# Patient Record
Sex: MALE | Race: WHITE | NOT HISPANIC OR LATINO | Employment: OTHER | URBAN - NONMETROPOLITAN AREA
[De-identification: names, ages, dates, MRNs, and addresses within clinical notes are randomized per-mention and may not be internally consistent; named-entity substitution may affect disease eponyms.]

---

## 2023-02-03 PROBLEM — N41.9 PROSTATITIS: Status: RESOLVED | Noted: 2023-02-03 | Resolved: 2023-02-03

## 2023-02-03 PROBLEM — H81.10 BPPV (BENIGN PAROXYSMAL POSITIONAL VERTIGO): Status: RESOLVED | Noted: 2023-02-03 | Resolved: 2023-02-03

## 2023-02-03 PROBLEM — R10.30 GROIN PAIN: Status: RESOLVED | Noted: 2023-02-03 | Resolved: 2023-02-03

## 2023-02-03 PROBLEM — R31.9 HEMATURIA: Status: RESOLVED | Noted: 2023-02-03 | Resolved: 2023-02-03

## 2023-02-03 PROBLEM — N52.9 ERECTILE DYSFUNCTION: Status: RESOLVED | Noted: 2023-02-03 | Resolved: 2023-02-03

## 2023-02-03 PROBLEM — R06.02 SOB (SHORTNESS OF BREATH) ON EXERTION: Status: RESOLVED | Noted: 2023-02-03 | Resolved: 2023-02-03

## 2023-02-03 PROBLEM — R35.0 URINARY FREQUENCY: Status: RESOLVED | Noted: 2023-02-03 | Resolved: 2023-02-03

## 2023-02-03 PROBLEM — B02.9 HERPES ZOSTER INFECTION OF THORACIC REGION: Status: RESOLVED | Noted: 2023-02-03 | Resolved: 2023-02-03

## 2023-02-03 PROBLEM — N32.89 ERYTHEMATOUS BLADDER MUCOSA: Status: RESOLVED | Noted: 2023-02-03 | Resolved: 2023-02-03

## 2023-02-03 PROBLEM — R35.1 NOCTURIA: Status: RESOLVED | Noted: 2023-02-03 | Resolved: 2023-02-03

## 2023-02-03 PROBLEM — I73.9 PVD (PERIPHERAL VASCULAR DISEASE) (CMS-HCC): Status: RESOLVED | Noted: 2023-02-03 | Resolved: 2023-02-03

## 2023-02-03 PROBLEM — N32.89 BLADDER WALL THICKENING: Status: RESOLVED | Noted: 2023-02-03 | Resolved: 2023-02-03

## 2023-02-03 PROBLEM — E78.5 HLD (HYPERLIPIDEMIA): Status: ACTIVE | Noted: 2023-02-03

## 2023-02-03 PROBLEM — R68.89 ABNORMAL FINDING ON SCREENING PROCEDURE: Status: RESOLVED | Noted: 2023-02-03 | Resolved: 2023-02-03

## 2023-02-03 RX ORDER — ACETAMINOPHEN 160 MG/5ML
SUSPENSION, ORAL (FINAL DOSE FORM) ORAL
COMMUNITY
Start: 2021-04-06

## 2023-02-03 RX ORDER — METHYLDOPA 250 MG/1
TABLET, FILM COATED ORAL
COMMUNITY
Start: 2021-04-06 | End: 2024-05-07 | Stop reason: WASHOUT

## 2023-02-03 RX ORDER — LUTEIN 10 MG
TABLET ORAL
COMMUNITY
Start: 2021-04-06

## 2023-02-03 RX ORDER — ELECTROLYTES/DEXTROSE
SOLUTION, ORAL ORAL
COMMUNITY
Start: 2021-04-06 | End: 2024-05-07 | Stop reason: WASHOUT

## 2023-02-03 RX ORDER — ACETAMINOPHEN 500 MG
TABLET ORAL
COMMUNITY
Start: 2021-04-06

## 2023-02-03 RX ORDER — ZEAXANTHIN 90 %
POWDER (GRAM) MISCELLANEOUS
COMMUNITY
Start: 2021-04-06

## 2023-02-03 RX ORDER — PERPHENAZINE/AMITRIPTYLINE HCL 2 MG-10 MG
TABLET ORAL
COMMUNITY
Start: 2021-04-06 | End: 2023-04-05 | Stop reason: ALTCHOICE

## 2023-02-03 RX ORDER — ATORVASTATIN CALCIUM 20 MG/1
1 TABLET, FILM COATED ORAL NIGHTLY
COMMUNITY
Start: 2022-04-13 | End: 2023-04-03

## 2023-04-04 PROBLEM — E66.9 CLASS 1 OBESITY WITH BODY MASS INDEX (BMI) OF 30.0 TO 30.9 IN ADULT: Status: ACTIVE | Noted: 2023-04-04

## 2023-04-04 PROBLEM — E66.811 CLASS 1 OBESITY WITH BODY MASS INDEX (BMI) OF 30.0 TO 30.9 IN ADULT: Status: ACTIVE | Noted: 2023-04-04

## 2023-04-05 ENCOUNTER — OFFICE VISIT (OUTPATIENT)
Dept: PRIMARY CARE | Facility: CLINIC | Age: 68
End: 2023-04-05
Payer: MEDICARE

## 2023-04-05 VITALS
BODY MASS INDEX: 29.89 KG/M2 | WEIGHT: 186 LBS | SYSTOLIC BLOOD PRESSURE: 130 MMHG | DIASTOLIC BLOOD PRESSURE: 80 MMHG | HEIGHT: 66 IN | HEART RATE: 69 BPM

## 2023-04-05 DIAGNOSIS — E78.2 MIXED HYPERLIPIDEMIA: ICD-10-CM

## 2023-04-05 DIAGNOSIS — Z00.00 MEDICARE ANNUAL WELLNESS VISIT, SUBSEQUENT: ICD-10-CM

## 2023-04-05 DIAGNOSIS — E66.09 CLASS 1 OBESITY DUE TO EXCESS CALORIES WITHOUT SERIOUS COMORBIDITY WITH BODY MASS INDEX (BMI) OF 30.0 TO 30.9 IN ADULT: ICD-10-CM

## 2023-04-05 DIAGNOSIS — Z13.6 SCREENING FOR AAA (ABDOMINAL AORTIC ANEURYSM): Primary | ICD-10-CM

## 2023-04-05 PROCEDURE — 1159F MED LIST DOCD IN RCRD: CPT | Performed by: INTERNAL MEDICINE

## 2023-04-05 PROCEDURE — 99213 OFFICE O/P EST LOW 20 MIN: CPT | Performed by: INTERNAL MEDICINE

## 2023-04-05 PROCEDURE — 3008F BODY MASS INDEX DOCD: CPT | Performed by: INTERNAL MEDICINE

## 2023-04-05 PROCEDURE — 1036F TOBACCO NON-USER: CPT | Performed by: INTERNAL MEDICINE

## 2023-04-05 PROCEDURE — G0439 PPPS, SUBSEQ VISIT: HCPCS | Performed by: INTERNAL MEDICINE

## 2023-04-05 PROCEDURE — 1160F RVW MEDS BY RX/DR IN RCRD: CPT | Performed by: INTERNAL MEDICINE

## 2023-04-05 ASSESSMENT — PATIENT HEALTH QUESTIONNAIRE - PHQ9
1. LITTLE INTEREST OR PLEASURE IN DOING THINGS: NOT AT ALL
2. FEELING DOWN, DEPRESSED OR HOPELESS: NOT AT ALL
SUM OF ALL RESPONSES TO PHQ9 QUESTIONS 1 AND 2: 0

## 2023-04-05 NOTE — PROGRESS NOTES
Chief Complaint: Medicare Wellness Exam/Comprehensive Problem Focused Follow Up and Physical Exam    HPI:  Patient is here today for [facility].       Active Problem List  Patient Active Problem List   Diagnosis    HLD (hyperlipidemia)    Class 1 obesity with body mass index (BMI) of 30.0 to 30.9 in adult    Screening for AAA (abdominal aortic aneurysm)       Comprehensive Medical/Surgical/Social/Family History  Past Medical History:   Diagnosis Date    Abnormal finding on screening procedure 02/03/2023    Bladder wall thickening 02/03/2023    BPPV (benign paroxysmal positional vertigo) 02/03/2023    Erythematous bladder mucosa 02/03/2023    Groin pain 02/03/2023    Hematuria 02/03/2023    Herpes zoster infection of thoracic region 02/03/2023    Nocturia 02/03/2023    Personal history of urinary (tract) infections     History of urinary tract infection    Prostatitis 02/03/2023    PVD (peripheral vascular disease) (CMS/[facility]) 02/03/2023    SOB (shortness of breath) on exertion 02/03/2023    Urinary frequency 02/03/2023     Past Surgical History:   Procedure Laterality Date    OTHER SURGICAL HISTORY  04/06/2021    Tonsillectomy with adenoidectomy     Social History     Tobacco Use    Smoking status: Former     Types: Cigarettes    Smokeless tobacco: Never   Substance Use Topics    Alcohol use: Yes     Comment: Weekly alcohol    Drug use: Never     Family History   Problem Relation Name Age of Onset    Dementia Father      Colon polyps Sister      Lung disease Other           Allergies and Medications  Patient has no known allergies.  Current Outpatient Medications on File Prior to Visit   Medication Sig Dispense Refill    atorvastatin (Lipitor) 20 mg tablet TAKE 1 TABLET BY MOUTH EVERYDAY AT BEDTIME 90 tablet 3    biotin 5 mg capsule Take by mouth.      cholecalciferol (Vitamin D-3) 50 mcg (2,000 unit) capsule Take by mouth.      coenzyme Q-10 200 mg capsule Take by mouth.      fish oil concentrate (Omega-3) 120-180 mg  capsule Take by mouth.      L. acidophilus/Bifid. animalis 32 billion cell capsule Take by mouth.      lutein 10 mg tablet Take by mouth.      methyldopa (Aldomet) 250 mg tablet Take by mouth.      zeaxanthin, bulk, 90 % powder Zeaxanthin Powder   Refills: 0        SteveSari troncoso DO   Start : 6-Apr-2021   Active      [DISCONTINUED] astaxanthin 4 mg capsule Take by mouth.      [DISCONTINUED] atorvastatin (Lipitor) 20 mg tablet Take 1 tablet (20 mg) by mouth at bedtime.       No current facility-administered medications on file prior to visit.       Medicare Wellness Questionnaire        How have you been on Medicare less than a year ? No  Have you had a Medicare Wellness exam before ? Yes  Have you had any surgeries in the last year ? No  Have you developed any new diseases in the last year ? No  Have any close family members developed new diseases in the last year ? No  Have you been to a the hospital in the last year ? No  Do you take any pills or supplements other than those prescribed for you ? Yes  Do you take any opiates for pain such as Tramadol, Percocet or Norco ? No  How do you consider your overall health ?Good  Have you ever used tobacco products ? Yes   Have you smoked more than 100 cigarettes in your life ?  Yes  What form of tobacco have you used ? Cigarettes  How many packs per day ? < 0.5 cigar smoker How many years ? 20  Have you quit ? Yes  Do you drink alcohol ?  Yes   What is the most you drink in one day ? 0  How many drinks usually in 1 Week ? 4  Do you or others tell you that your drinking is a problem ? No  Would you like to help to cut down or quit ? No  Have you ever used illegal drugs at anytime in your life including Marijuana ? No   Which of the following describes your diet ?Well balanced  How many days per week on average do you exercise ? 0 days  Do you have any loss of hearing ? No  Do you have hearing aids ? No  Have you or others noted you have loss of memory ? No  Do you need  "someone to assist you with any of the following ?none  Do you need someone to assist you with any of the following ?none  Have you fallen in the last 6 months ? No  Do you have any of the following in your house ? none  Do you have a living will ? No  Do you have a durable power of  for health care decisions ? No    Medications and Supplements  prescribed by me and other practitioners or clinical pharmacist (such as prescriptions, OTC's, herbal therapies and supplements) were reviewed and documented in the medical record.    Tobacco/Alcohol/Opioid use, as well as Illicit Drug Use was screened for/reviewed and documented in Social History section and medication list as appropriate  Activities of Daily Living  In your present state of health, do you have any difficulty performing the following activities?:   Preparing food and eating?: No  Bathing yourself: No  Getting dressed: No  Using the toilet:No  Moving around from place to place: No  In the past year have you fallen or had a near fall?:No    Depression Screen  (Note: if answer to either of the following is \"Yes\", then a more complete depression screening is indicated)   Q1: Over the past two weeks, have you felt down, depressed or hopeless?   no  Q2: Over the past two weeks, have you felt little interest or pleasure in doing things?   no    Current exercise habits: The patient does not participate in regular exercise at present.   Dietary issues discussed: Yes  Hearing difficulties: No  Safe in current home environment: yes  Visual Acuity assessed: no  Cognitive Impairment assessed: yes       Advance directives  Advanced Care Planning (including a Living Will, Healthcare POA, as well as specific end of life choices and/or directives), was discussed for approximately 5 minutes with the patient and/or surrogate, voluntarily, and documented in the medical record.     Cardiac Risk Assessment  Cardiovascular risk was discussed and, if needed, lifestyle " "modifications recommended, including nutritional choices, exercise, and elimination of habits contributing to risk. We agreed on a plan to reduce the current cardiovascular risk based on above discussion as needed.  Aspirin use/disuse was discussed after reviewing the updated guidelines below:    Consider low dose Aspirin ( mg) use if the benefit for cardiovascular disease prevention outweighs risk for bleeding complications.   In general, low dose ASA should be considered:  In patients WITHOUT prior MI/stroke/PAD (primary prevention):   a. Age <60: Use if 10-year cardiovascular disease risk >20%, with discussion of risks and benefits with patient  b. Age 60-<70: Use if 10-year cardiovascular disease risk >20% and low bleeding (e.g., gastrointenstinal) risk, with discussion of risks and benefits with patient  c. Age >=70: Do not use    In patients WITH prior MI/stroke/PAD (secondary prevention):   Generally use unless extremely high bleeding (e.g., gastrointenstinal) risk, with discussion of risks and benefits with patient    ROS otherwise negative aside from what was mentioned above in HPI.    Vitals  /80 (BP Location: Right arm, Patient Position: Sitting, BP Cuff Size: Adult)   Pulse 69   Ht 1.676 m (5' 6\")   Wt 84.4 kg (186 lb)   BMI 30.02 kg/m²   Body mass index is 30.02 kg/m².  Physical Exam  Gen: Alert, NAD  HEENT:  PERRLA, EOMI, conjunctiva and sclera normal in appearance.   Neck:  Supple with FROM; No masses/nodes palpable; Thyroid nontender and without nodules; No BONNIE  Respiratory:  Lungs CTAB  Cardiovascular:  Heart RRR. No M/R/G. Peripheral pulses equal bilaterally  Abdomen:  Soft, nontender, BS present throughout; No R/G/R; No HSM or masses palpated  Extremities:  FROM all extremities; Muscle strength grossly normal with good tone  Neuro:  CN II-XII intact; Reflexes 2+/2+; Gross motor and sensory intact  Skin:  No suspicious lesions present    Risk Factors Identified During Visit: Weight: " BMI < 18.5 or > 25.   Weight Concerns: provided diet and exercise counseling.   Influenza: influenza vaccine was previously given.   Pneumovax 23: Pneumovax 23 vaccine was previously given.   Prevnar 13: Prevnar 13 vaccine was previously given.   Shingles Vaccine: Shingles vaccine was recommended.   Prostate cancer screening: Screening is current.   Colorectal Cancer Screening: screening is current.   Abdominal Aortic Aneurysm screening: the patient declines screening.   Assessment and Plan:  Problem List Items Addressed This Visit          Endocrine/Metabolic    Class 1 obesity with body mass index (BMI) of 30.0 to 30.9 in adult       Other    HLD (hyperlipidemia)    Current Assessment & Plan     - cholesterol panel in fall 22  - continue statin          Screening for AAA (abdominal aortic aneurysm) - Primary    Current Assessment & Plan     - will order AAA us screening                 During the course of the visit the patient was educated and counseled about age appropriate screening and preventive services. Completed preventive screenings were documented in the chart and orders were placed for outstanding screenings/procedures as documented in the Assessment and Plan.      Patient Instructions (the written plan) was given to the patient at check out.      Sari Vigil DO

## 2023-04-26 NOTE — ASSESSMENT & PLAN NOTE
- cholesterol panel in fall 22  - continue statin    Patient returned call regarding recent diagnosis of breast cancer.  I answered all questions to the best of my ability.  Patient voiced appreciation and understanding.  He has an appointment in 1 week with the surgeons for further evaluation.  I discussed that resection of the lesion and axillary lymph node dissection will give us a better understanding of the future treatment course.  Patient voiced understanding.    Dr. Eisenberg

## 2023-10-13 ENCOUNTER — TELEPHONE (OUTPATIENT)
Dept: PHARMACY | Facility: HOSPITAL | Age: 68
End: 2023-10-13
Payer: MEDICARE

## 2023-10-13 NOTE — TELEPHONE ENCOUNTER
Population Health: Outreach by Ambulatory Pharmacy Team    Payor: [employer] [place]  Reason: Adherence  Medication: atorvastatin 20mg (refill due 10/2)   Outcome: No Answer/Invalid Number line was busy     Patricia Caceres

## 2023-11-18 ENCOUNTER — TELEPHONE (OUTPATIENT)
Dept: PHARMACY | Facility: HOSPITAL | Age: 68
End: 2023-11-18

## 2023-11-18 NOTE — TELEPHONE ENCOUNTER
Population Health: Outreach by Ambulatory Pharmacy Team    [place]or: [employer] [place]  Reason: Adherence  Medication: Atorvastatin 20 mg  Outcome: No Answer/Invalid Number- following attempted phone call saw in dispense history that it was recently filled 11/13/2023 x 90 days    Josephine Carreno, PharmD

## 2024-04-05 ENCOUNTER — OFFICE VISIT (OUTPATIENT)
Dept: PRIMARY CARE | Facility: CLINIC | Age: 69
End: 2024-04-05
Payer: MEDICARE

## 2024-04-05 VITALS
HEIGHT: 66 IN | WEIGHT: 190 LBS | DIASTOLIC BLOOD PRESSURE: 81 MMHG | HEART RATE: 81 BPM | SYSTOLIC BLOOD PRESSURE: 137 MMHG | BODY MASS INDEX: 30.53 KG/M2

## 2024-04-05 DIAGNOSIS — Z00.00 MEDICARE ANNUAL WELLNESS VISIT, SUBSEQUENT: ICD-10-CM

## 2024-04-05 DIAGNOSIS — Z13.6 SCREENING FOR HEART DISEASE: ICD-10-CM

## 2024-04-05 DIAGNOSIS — E78.2 MIXED HYPERLIPIDEMIA: ICD-10-CM

## 2024-04-05 DIAGNOSIS — Z12.5 SCREENING FOR PROSTATE CANCER: Primary | ICD-10-CM

## 2024-04-05 DIAGNOSIS — R06.02 SOB (SHORTNESS OF BREATH) ON EXERTION: ICD-10-CM

## 2024-04-05 PROCEDURE — G0446 INTENS BEHAVE THER CARDIO DX: HCPCS | Performed by: INTERNAL MEDICINE

## 2024-04-05 PROCEDURE — 1160F RVW MEDS BY RX/DR IN RCRD: CPT | Performed by: INTERNAL MEDICINE

## 2024-04-05 PROCEDURE — 99214 OFFICE O/P EST MOD 30 MIN: CPT | Performed by: INTERNAL MEDICINE

## 2024-04-05 PROCEDURE — G0439 PPPS, SUBSEQ VISIT: HCPCS | Performed by: INTERNAL MEDICINE

## 2024-04-05 PROCEDURE — 1036F TOBACCO NON-USER: CPT | Performed by: INTERNAL MEDICINE

## 2024-04-05 PROCEDURE — G0444 DEPRESSION SCREEN ANNUAL: HCPCS | Performed by: INTERNAL MEDICINE

## 2024-04-05 PROCEDURE — 1159F MED LIST DOCD IN RCRD: CPT | Performed by: INTERNAL MEDICINE

## 2024-04-05 ASSESSMENT — PATIENT HEALTH QUESTIONNAIRE - PHQ9
1. LITTLE INTEREST OR PLEASURE IN DOING THINGS: NOT AT ALL
SUM OF ALL RESPONSES TO PHQ9 QUESTIONS 1 AND 2: 0
2. FEELING DOWN, DEPRESSED OR HOPELESS: NOT AT ALL

## 2024-04-05 NOTE — PROGRESS NOTES
Chief Complaint: Medicare Wellness Exam/Comprehensive Problem Focused Follow Up and Physical Exam    HPI:  Patient is here today for [facility].       Active Problem List  Patient Active Problem List   Diagnosis    HLD (hyperlipidemia)    Class 1 obesity with body mass index (BMI) of 30.0 to 30.9 in adult    Screening for AAA (abdominal aortic aneurysm)       Comprehensive Medical/Surgical/Social/Family History  Past Medical History:   Diagnosis Date    Abnormal finding on screening procedure 02/03/2023    Bladder wall thickening 02/03/2023    BPPV (benign paroxysmal positional vertigo) 02/03/2023    Erythematous bladder mucosa 02/03/2023    Groin pain 02/03/2023    Hematuria 02/03/2023    Herpes zoster infection of thoracic region 02/03/2023    Nocturia 02/03/2023    Personal history of urinary (tract) infections     History of urinary tract infection    Prostatitis 02/03/2023    PVD (peripheral vascular disease) (CMS/[facility]) 02/03/2023    SOB (shortness of breath) on exertion 02/03/2023    Urinary frequency 02/03/2023     Past Surgical History:   Procedure Laterality Date    OTHER SURGICAL HISTORY  04/06/2021    Tonsillectomy with adenoidectomy     Social History     Tobacco Use    Smoking status: Former     Types: Cigarettes    Smokeless tobacco: Never   Substance Use Topics    Alcohol use: Yes     Comment: Weekly alcohol    Drug use: Never     Family History   Problem Relation Name Age of Onset    Dementia Father      Colon polyps Sister      Lung disease Other           Allergies and Medications  Patient has no known allergies.  Current Outpatient Medications on File Prior to Visit   Medication Sig Dispense Refill    atorvastatin (Lipitor) 20 mg tablet TAKE 1 TABLET BY MOUTH EVERYDAY AT BEDTIME 90 tablet 3    biotin 5 mg capsule Take by mouth.      cholecalciferol (Vitamin D-3) 50 mcg (2,000 unit) capsule Take by mouth.      coenzyme Q-10 200 mg capsule Take by mouth.      fish oil concentrate (Omega-3) 120-180 mg  capsule Take by mouth.      L. acidophilus/Bifid. animalis 32 billion cell capsule Take by mouth.      lutein 10 mg tablet Take by mouth.      methyldopa (Aldomet) 250 mg tablet Take by mouth.      zeaxanthin, bulk, 90 % powder Zeaxanthin Powder   Refills: 0        Musa Sari GONZALEZ   Start : 6-Apr-2021   Active       No current facility-administered medications on file prior to visit.       Medicare Wellness Questionnaire        How have you been on Medicare less than a year ? No  Have you had a Medicare Wellness exam before ? Yes  Have you had any surgeries in the last year ? No  Have you developed any new diseases in the last year ? No  Have any close family members developed new diseases in the last year ? No  Have you been to a the hospital in the last year ? No  Do you take any pills or supplements other than those prescribed for you ? Yes  Do you take any opiates for pain such as Tramadol, Percocet or Norco ? No  How do you consider your overall health ?Good  Have you ever used tobacco products ? Yes   Have you smoked more than 100 cigarettes in your life ?  Yes  What form of tobacco have you used ? Cigarettes  How many packs per day ? 0.5-1 ppd  How many years ? <5 years   Have you quit ? Yes  Do you drink alcohol ?  Yes   What is the most you drink in one day ? 0  How many drinks usually in 1 Week ? 4-5 beer a week   Do you or others tell you that your drinking is a problem ? No  Have you ever used illegal drugs at anytime in your life including Marijuana ? No   Which of the following describes your diet ?Well balanced  How many days per week on average do you exercise ? 0 days  Do you have any loss of hearing ? No  Do you have hearing aids ? No  Have you or others noted you have loss of memory ? No  Do you need someone to assist you with any of the following ?none   Do you need someone to assist you with any of the following ?none   Have you fallen in the last 6 months ? No  Do you have any of the  "following in your house ? None   Do you have a living will ? No  Do you have a durable power of  for health care decisions ? No    Medications and Supplements  prescribed by me and other practitioners or clinical pharmacist (such as prescriptions, OTC's, herbal therapies and supplements) were reviewed and documented in the medical record.    Tobacco/Alcohol/Opioid use, as well as Illicit Drug Use was screened for/reviewed and documented in Social History section and medication list as appropriate  Activities of Daily Living  In your present state of health, do you have any difficulty performing the following activities?:   Preparing food and eating?: No  Bathing yourself: No  Getting dressed: No  Using the toilet:No  Moving around from place to place: No  In the past year have you fallen or had a near fall?:No    Depression Screen  (Note: if answer to either of the following is \"Yes\", then a more complete depression screening is indicated)   Q1: Over the past two weeks, have you felt down, depressed or hopeless?   No   Q2: Over the past two weeks, have you felt little interest or pleasure in doing things?   No     Current exercise habits: The patient does not participate in regular exercise at present.   Dietary issues discussed: Yes  Hearing difficulties: No  Safe in current home environment: yes  Visual Acuity assessed: no  Cognitive Impairment assessed: yes       Advance directives  Advanced Care Planning (including a Living Will, Healthcare POA, as well as specific end of life choices and/or directives), was discussed for approximately 5 minutes with the patient and/or surrogate, voluntarily, and documented in the medical record.     Cardiac Risk Assessment  Cardiovascular risk was discussed and, if needed, lifestyle modifications recommended, including nutritional choices, exercise, and elimination of habits contributing to risk. We agreed on a plan to reduce the current cardiovascular risk based on " "above discussion as needed.  Aspirin use/disuse was discussed after reviewing the updated guidelines below:    Consider low dose Aspirin ( mg) use if the benefit for cardiovascular disease prevention outweighs risk for bleeding complications.   In general, low dose ASA should be considered:  In patients WITHOUT prior MI/stroke/PAD (primary prevention):   a. Age <60: Use if 10-year cardiovascular disease risk >20%, with discussion of risks and benefits with patient  b. Age 60-<70: Use if 10-year cardiovascular disease risk >20% and low bleeding (e.g., gastrointenstinal) risk, with discussion of risks and benefits with patient  c. Age >=70: Do not use    In patients WITH prior MI/stroke/PAD (secondary prevention):   Generally use unless extremely high bleeding (e.g., gastrointenstinal) risk, with discussion of risks and benefits with patient    ASCVD Risk calculated at 15.9%     ROS otherwise negative aside from what was mentioned above in HPI.    Vitals  /81   Pulse 81   Ht 1.676 m (5' 6\")   Wt 86.2 kg (190 lb)   BMI 30.67 kg/m²   Body mass index is 30.67 kg/m².  Physical Exam  Gen: Alert, NAD  HEENT:  PERRLA, EOMI, conjunctiva and sclera normal in appearance.   Neck:  Supple with FROM; No masses/nodes palpable; Thyroid nontender and without nodules; No BONNIE  Respiratory:  Lungs CTAB  Cardiovascular:  Heart RRR. No M/R/G. Peripheral pulses equal bilaterally  Abdomen:  Soft, nontender, BS present throughout; No R/G/R; No HSM or masses palpated  Extremities:  FROM all extremities; Muscle strength grossly normal with good tone  Neuro:  CN II-XII intact; Reflexes 2+/2+; Gross motor and sensory intact  Skin:  No suspicious lesions present    Immunizations   Flu 2022, did not get this year   COVID received   PNA recommended   Shingles recommended   Rsv recommended     Colonoscopy 2021  Psa will order psa  Assessment and Plan:  Problem List Items Addressed This Visit       HLD (hyperlipidemia)    Relevant " Orders    Lipid Panel    Comprehensive Metabolic Panel     Other Visit Diagnoses       Screening for prostate cancer    -  Primary    Relevant Orders    Prostate Spec.Ag,Screen    SOB (shortness of breath) on exertion        Relevant Orders    Complete Pulmonary Function Test Pre/Post Bronchodialator (Spirometry Pre/Post/DLCO/Lung Volumes)    Transthoracic Echo Complete    Screening for heart disease        Relevant Orders    CT cardiac scoring wo IV contrast          HLD     - cholesterol panel in fall 22, will order   - continue statin         2. Sob on exertion, former smoker, denies chest pain   - will order pfts   - echocardiogram   - ct calcium score   - pending results will order further testing or consultation       During the course of the visit the patient was educated and counseled about age appropriate screening and preventive services. Completed preventive screenings were documented in the chart and orders were placed for outstanding screenings/procedures as documented in the Assessment and Plan.      Patient Instructions (the written plan) was given to the patient at check out.      Sari Vigil DO

## 2024-04-08 ENCOUNTER — LAB (OUTPATIENT)
Dept: LAB | Facility: LAB | Age: 69
End: 2024-04-08
Payer: MEDICARE

## 2024-04-08 ENCOUNTER — HOSPITAL ENCOUNTER (OUTPATIENT)
Dept: RESPIRATORY THERAPY | Facility: HOSPITAL | Age: 69
Discharge: HOME | End: 2024-04-08
Payer: MEDICARE

## 2024-04-08 DIAGNOSIS — R06.02 SOB (SHORTNESS OF BREATH) ON EXERTION: ICD-10-CM

## 2024-04-08 DIAGNOSIS — Z12.5 SCREENING FOR PROSTATE CANCER: ICD-10-CM

## 2024-04-08 DIAGNOSIS — E78.2 MIXED HYPERLIPIDEMIA: ICD-10-CM

## 2024-04-08 LAB
ALBUMIN SERPL BCP-MCNC: 4.6 G/DL (ref 3.4–5)
ALP SERPL-CCNC: 56 U/L (ref 33–136)
ALT SERPL W P-5'-P-CCNC: 48 U/L (ref 10–52)
ANION GAP SERPL CALC-SCNC: 13 MMOL/L (ref 10–20)
AST SERPL W P-5'-P-CCNC: 30 U/L (ref 9–39)
BILIRUB SERPL-MCNC: 1.1 MG/DL (ref 0–1.2)
BUN SERPL-MCNC: 17 MG/DL (ref 6–23)
CALCIUM SERPL-MCNC: 10 MG/DL (ref 8.6–10.3)
CHLORIDE SERPL-SCNC: 101 MMOL/L (ref 98–107)
CHOLEST SERPL-MCNC: 149 MG/DL (ref 0–199)
CHOLESTEROL/HDL RATIO: 2.7
CO2 SERPL-SCNC: 28 MMOL/L (ref 21–32)
CREAT SERPL-MCNC: 1.27 MG/DL (ref 0.5–1.3)
EGFRCR SERPLBLD CKD-EPI 2021: 61 ML/MIN/1.73M*2
GLUCOSE SERPL-MCNC: 124 MG/DL (ref 74–99)
HDLC SERPL-MCNC: 56 MG/DL
LDLC SERPL CALC-MCNC: 71 MG/DL
NON HDL CHOLESTEROL: 93 MG/DL (ref 0–149)
POTASSIUM SERPL-SCNC: 4.4 MMOL/L (ref 3.5–5.3)
PROT SERPL-MCNC: 7.4 G/DL (ref 6.4–8.2)
PSA SERPL-MCNC: 2.95 NG/ML
SODIUM SERPL-SCNC: 138 MMOL/L (ref 136–145)
TRIGL SERPL-MCNC: 109 MG/DL (ref 0–149)
VLDL: 22 MG/DL (ref 0–40)

## 2024-04-08 PROCEDURE — 80053 COMPREHEN METABOLIC PANEL: CPT

## 2024-04-08 PROCEDURE — 36415 COLL VENOUS BLD VENIPUNCTURE: CPT

## 2024-04-08 PROCEDURE — G0103 PSA SCREENING: HCPCS

## 2024-04-08 PROCEDURE — 80061 LIPID PANEL: CPT

## 2024-04-08 PROCEDURE — 94726 PLETHYSMOGRAPHY LUNG VOLUMES: CPT

## 2024-04-16 ENCOUNTER — HOSPITAL ENCOUNTER (OUTPATIENT)
Dept: RADIOLOGY | Facility: HOSPITAL | Age: 69
Discharge: HOME | End: 2024-04-16
Payer: MEDICARE

## 2024-04-16 DIAGNOSIS — Z13.6 SCREENING FOR HEART DISEASE: ICD-10-CM

## 2024-04-16 PROCEDURE — 75571 CT HRT W/O DYE W/CA TEST: CPT

## 2024-04-17 ENCOUNTER — TELEPHONE (OUTPATIENT)
Dept: PRIMARY CARE | Facility: CLINIC | Age: 69
End: 2024-04-17
Payer: MEDICARE

## 2024-04-17 DIAGNOSIS — R93.1 ABNORMAL HEART SCORE CT: Primary | ICD-10-CM

## 2024-04-18 NOTE — PROGRESS NOTES
CHIEF COMPLAINT  Referred by PCP, Dr. Vigil for abnormal heart score    HISTORY OF PRESENT ILLNESS    Cardiovascular hx:    1.        Cardiac testing:  Echo (April, 2024)  Coronary artery calcium score (April, 2024)-410.98        Past Medical History  Past Medical History:   Diagnosis Date    Abnormal finding on screening procedure 02/03/2023    Bladder wall thickening 02/03/2023    BPPV (benign paroxysmal positional vertigo) 02/03/2023    Erythematous bladder mucosa 02/03/2023    Groin pain 02/03/2023    Hematuria 02/03/2023    Herpes zoster infection of thoracic region 02/03/2023    Nocturia 02/03/2023    Personal history of urinary (tract) infections     History of urinary tract infection    Prostatitis 02/03/2023    PVD (peripheral vascular disease) (CMS-HCC) 02/03/2023    SOB (shortness of breath) on exertion 02/03/2023    Urinary frequency 02/03/2023       Social History  Social History     Tobacco Use    Smoking status: Former     Types: Cigarettes    Smokeless tobacco: Never   Substance Use Topics    Alcohol use: Yes     Comment: Weekly alcohol    Drug use: Never       Family History     Family History   Problem Relation Name Age of Onset    Dementia Father      Colon polyps Sister      Lung disease Other          Allergies:  No Known Allergies     Outpatient Medications:  Current Outpatient Medications   Medication Instructions    atorvastatin (Lipitor) 20 mg tablet TAKE 1 TABLET BY MOUTH EVERYDAY AT BEDTIME    biotin 5 mg capsule oral    cholecalciferol (Vitamin D-3) 50 mcg (2,000 unit) capsule oral    coenzyme Q-10 200 mg capsule oral    fish oil concentrate (Omega-3) 120-180 mg capsule oral    L. acidophilus/Bifid. animalis 32 billion cell capsule oral    lutein 10 mg tablet oral    methyldopa (Aldomet) 250 mg tablet oral    zeaxanthin, bulk, 90 % powder Zeaxanthin Powder   Refills: 0        Sari Vigil DO   Start : 6-Apr-2021   Active          Labs:   CMP:  Recent Labs      "04/08/24  1051 04/13/22  0741 05/04/21  0703 04/08/21  0907    139  --  139   K 4.4 4.0  --  4.2    104  --  106   CO2 28 29  --  27   ANIONGAP 13 10  --  10   BUN 17 22 20 18   CREATININE 1.27 1.02 1.25 1.06   EGFR 61  --   --   --      Recent Labs     04/08/24  1051 04/13/22  0741 04/08/21  0907   ALBUMIN 4.6 4.4 4.2   ALKPHOS 56 53 55   ALT 48 35 30   AST 30 26 21   BILITOT 1.1 1.2 1.2     CBC:  Recent Labs     04/08/21  0907   WBC 6.1   HGB 15.0   HCT 46.5      MCV 88     COAG: No results for input(s): \"PTT\", \"INR\", \"HAUF\", \"DDIMERVTE\", \"HAPTOGLOBIN\", \"FIBRINOGEN\" in the last 81626 hours.  ABO: No results for input(s): \"ABO\" in the last 86864 hours.  HEME/ENDO:  Recent Labs     04/08/21  0907   HGBA1C 5.8      CARDIAC: No results for input(s): \"LDH\", \"CKMB\", \"TROPHS\", \"BNP\" in the last 94100 hours.    No lab exists for component: \"CK\", \"CKMBP\"  Recent Labs     04/08/24  1051 10/07/22  0930 04/13/22  0741 04/08/21  0907   CHOL 149 134 250* 193   LDLF  --  59 171* 115*   HDL 56.0 55.0 54.0 46.0   TRIG 109 102 125 160*     MICRO: No results for input(s): \"ESR\", \"CRP\", \"PROCAL\" in the last 25494 hours.  No results found for the last 90 days.    Notable Studies: imaging personally reviewed   EKG:No results found for this or any previous visit (from the past 4464 hour(s)).  Echocardiogram: No results found for this or any previous visit from the past 1825 days.    Stress Testing: No results found for this or any previous visit from the past 1825 days.    Cardiac Catheterization: No results found for this or any previous visit from the past 1825 days.  No results found for this or any previous visit from the past 3650 days.         REVIEW OF SYSTEMS  A 10-point system review was completed and was negative except as noted in the HPI.      VITALS  There were no vitals filed for this visit.    PHYSICAL EXAM  General: awake, alert and oriented. No acute distress.   Skin: Skin is warm, dry and intact " without rashes or lesions.  HEENT: normocephalic, atraumatic; conjunctivae are clear without exudates or hemorrhage. Sclera is non-icteric. Eyelids are normal in appearance without swelling or lesions. Hearing intact. Nares are patent bilaterally. Moist mucous membranes.   Cardiovascular: heart rate and rhythm are normal. No murmurs, gallops, or rubs are auscultated. S1 and S2 are heard and are of normal intensity. No JVD, no carotid bruits  Respiratory: bilateral lung sounds clear to auscultations without rales, rhonchi, or wheezes. No accessory muscle use or stridor  Gastrointestinal: non-distended, non-tender  Genitourinary: exam deferred  Musculoskeletal: ROM intact, no deformities  Extremities: pulses palpable bilaterally; no swelling or erythema  Neurological: no focal deficits; gait steady  Psychiatric: appropriate mood and affect; good judgment and insight          ASSESSMENT AND PLAN  Assessment/Plan   {Assess/PlanSmartLinks:[id number]}          RTC:      Thank you for allowing me to participate in the care of this patient. Please reach me out if you have any questions or if you need any clarifications regarding the patient's care.    Emilie Kaufman, MONET, APRN, FNP-C  Division of Cardiovascular Medicine  [facility]  [facility]

## 2024-04-19 ENCOUNTER — APPOINTMENT (OUTPATIENT)
Dept: CARDIOLOGY | Facility: HOSPITAL | Age: 69
End: 2024-04-19
Payer: MEDICARE

## 2024-04-23 ENCOUNTER — APPOINTMENT (OUTPATIENT)
Dept: CARDIOLOGY | Facility: CLINIC | Age: 69
End: 2024-04-23
Payer: MEDICARE

## 2024-04-29 ENCOUNTER — HOSPITAL ENCOUNTER (OUTPATIENT)
Dept: CARDIOLOGY | Facility: HOSPITAL | Age: 69
Discharge: HOME | End: 2024-04-29
Payer: MEDICARE

## 2024-04-29 VITALS
BODY MASS INDEX: 30.53 KG/M2 | SYSTOLIC BLOOD PRESSURE: 145 MMHG | WEIGHT: 190 LBS | HEART RATE: 91 BPM | DIASTOLIC BLOOD PRESSURE: 80 MMHG | HEIGHT: 66 IN

## 2024-04-29 DIAGNOSIS — R06.02 SOB (SHORTNESS OF BREATH) ON EXERTION: ICD-10-CM

## 2024-04-29 LAB
AORTIC VALVE MEAN GRADIENT: 3 MMHG
AORTIC VALVE PEAK VELOCITY: 1.13 M/S
AV PEAK GRADIENT: 5.1 MMHG
AVA (PEAK VEL): 2.63 CM2
AVA (VTI): 2.81 CM2
EJECTION FRACTION APICAL 4 CHAMBER: 51.8
LEFT ATRIUM VOLUME AREA LENGTH INDEX BSA: 11.7 ML/M2
LEFT VENTRICLE INTERNAL DIMENSION DIASTOLE: 4.16 CM (ref 3.5–6)
LEFT VENTRICULAR OUTFLOW TRACT DIAMETER: 2 CM
LV EJECTION FRACTION BIPLANE: 48 %
MITRAL VALVE E/A RATIO: 0.45
RIGHT VENTRICLE FREE WALL PEAK S': 11.1 CM/S
TRICUSPID ANNULAR PLANE SYSTOLIC EXCURSION: 2.4 CM

## 2024-04-29 PROCEDURE — 93306 TTE W/DOPPLER COMPLETE: CPT

## 2024-04-29 PROCEDURE — 93306 TTE W/DOPPLER COMPLETE: CPT | Performed by: STUDENT IN AN ORGANIZED HEALTH CARE EDUCATION/TRAINING PROGRAM

## 2024-05-03 NOTE — PROGRESS NOTES
CHIEF COMPLAINT  Referred by Dr. Vigil for abnormal heart score    HISTORY OF PRESENT ILLNESS    Patient referred by PCP for coronary calcium score of 410.98.   -Recent lipid panel showing cholesterol and LDL at goal; currently on atorvastatin 20mg daily   -ASCVD risk-16.2%    Currently, patient reports dyspnea on exertion over the last couple of years. He denies any chest pain/pressure/discomfort, palpitations, orthopnea/PND, presyncope, syncope, or lower extremity edema. He reports compliance with medications.     Contributory family hx:  None    Social hx:  Former smoker having quit 25 years ago  Occasional beer drinking, but not daily     Cardiovascular testin.Echo ()-preserved biventricular function with grade I diastolic dysfunction        Past Medical History  Past Medical History:   Diagnosis Date    Abnormal finding on screening procedure 2023    Bladder wall thickening 2023    BPPV (benign paroxysmal positional vertigo) 2023    Erythematous bladder mucosa 2023    Groin pain 2023    Hematuria 2023    Herpes zoster infection of thoracic region 2023    Nocturia 2023    Personal history of urinary (tract) infections     History of urinary tract infection    Prostatitis 2023    PVD (peripheral vascular disease) (CMS-[facility]) 2023    SOB (shortness of breath) on exertion 2023    Urinary frequency 2023       Social History  Social History     Tobacco Use    Smoking status: Former     Types: Cigarettes    Smokeless tobacco: Never   Substance Use Topics    Alcohol use: Yes     Comment: Weekly alcohol    Drug use: Never       Family History     Family History   Problem Relation Name Age of Onset    Dementia Father      Colon polyps Sister      Lung disease Other          Allergies:  No Known Allergies     Outpatient Medications:  Current Outpatient Medications   Medication Instructions    aspirin 81 mg, oral, Daily     "atorvastatin (LIPITOR) 40 mg, oral, Daily    cholecalciferol (Vitamin D-3) 50 mcg (2,000 unit) capsule oral    coenzyme Q-10 200 mg capsule oral    fish oil concentrate (Omega-3) 120-180 mg capsule oral    lutein 10 mg tablet oral    zeaxanthin, bulk, 90 % powder Zeaxanthin Powder   Refills: 0        Sari Vigil DO   Start : 6-Apr-2021   Active          Labs:   CMP:  Recent Labs     04/08/24  1051 04/13/22  0741 05/04/21  0703 04/08/21  0907    139  --  139   K 4.4 4.0  --  4.2    104  --  106   CO2 28 29  --  27   ANIONGAP 13 10  --  10   BUN 17 22 20 18   CREATININE 1.27 1.02 1.25 1.06   EGFR 61  --   --   --      Recent Labs     04/08/24  1051 04/13/22  0741 04/08/21  0907   ALBUMIN 4.6 4.4 4.2   ALKPHOS 56 53 55   ALT 48 35 30   AST 30 26 21   BILITOT 1.1 1.2 1.2     CBC:  Recent Labs     04/08/21  0907   WBC 6.1   HGB 15.0   HCT 46.5      MCV 88     COAG: No results for input(s): \"PTT\", \"INR\", \"HAUF\", \"DDIMERVTE\", \"HAPTOGLOBIN\", \"FIBRINOGEN\" in the last 68892 hours.  ABO: No results for input(s): \"ABO\" in the last 01836 hours.  HEME/ENDO:  Recent Labs     04/08/21  0907   HGBA1C 5.8      CARDIAC: No results for input(s): \"LDH\", \"CKMB\", \"TROPHS\", \"BNP\" in the last 09208 hours.    No lab exists for component: \"CK\", \"CKMBP\"  Recent Labs     04/08/24  1051 10/07/22  0930 04/13/22  0741 04/08/21  0907   CHOL 149 134 250* 193   LDLF  --  59 171* 115*   HDL 56.0 55.0 54.0 46.0   TRIG 109 102 125 160*     MICRO: No results for input(s): \"ESR\", \"CRP\", \"PROCAL\" in the last 03493 hours.  No results found for the last 90 days.    Notable Studies: imaging personally reviewed   EKG:No results found for this or any previous visit (from the past 4464 hour(s)).  Echocardiogram: No results found for this or any previous visit from the past 1825 days.    Stress Testing: No results found for this or any previous visit from the past 1825 days.    Cardiac Catheterization: No results found for this or any " previous visit from the past 1825 days.  No results found for this or any previous visit from the past 3650 days.         REVIEW OF SYSTEMS  A 10-point system review was completed and was negative except as noted in the HPI.      VITALS  Vitals:    05/07/24 1013   BP: 140/82   Pulse: 83   SpO2: 94%       PHYSICAL EXAM  General: awake, alert and oriented. No acute distress.   Skin: Skin is warm, dry and intact without rashes or lesions.  HEENT: normocephalic, atraumatic; conjunctivae are clear without exudates or hemorrhage. Sclera is non-icteric. Eyelids are normal in appearance without swelling or lesions. Hearing intact. Nares are patent bilaterally. Moist mucous membranes.   Cardiovascular: heart rate and rhythm are normal. No murmurs, gallops, or rubs are auscultated. S1 and S2 are heard and are of normal intensity. No JVD, no carotid bruits  Respiratory: bilateral lung sounds clear to auscultations without rales, rhonchi, or wheezes. No accessory muscle use or stridor  Gastrointestinal: non-distended, non-tender  Genitourinary: exam deferred  Musculoskeletal: ROM intact, no deformities  Extremities: pulses palpable bilaterally; no swelling or erythema  Neurological: no focal deficits; gait steady  Psychiatric: appropriate mood and affect; good judgment and insight          ASSESSMENT AND PLAN  Assessment/Plan   Diagnoses and all orders for this visit:  Abnormal Heart Score CT  -Reviewed test results and further discussed his current CVD risk  -Start ASA 81mg daily  -Increase atorvastatin to 40mg daily    SOB (shortness of breath) on exertion  -Will order exercise stress test to rule out ischemia  -IOEKG-NSR with HR of 75 bpm          RTC: after testing      Thank you for allowing me to participate in the care of this patient. Please reach me out if you have any questions or if you need any clarifications regarding the patient's care.    Emilie Kaufman, MONET, APRN, FNP-C  Division of Cardiovascular  Medicine  [facility]  [facility]

## 2024-05-04 DIAGNOSIS — E78.5 HYPERLIPIDEMIA, UNSPECIFIED: ICD-10-CM

## 2024-05-06 RX ORDER — ATORVASTATIN CALCIUM 20 MG/1
TABLET, FILM COATED ORAL
Qty: 90 TABLET | Refills: 3 | Status: SHIPPED | OUTPATIENT
Start: 2024-05-06 | End: 2024-05-07 | Stop reason: SDUPTHER

## 2024-05-07 ENCOUNTER — OFFICE VISIT (OUTPATIENT)
Dept: CARDIOLOGY | Facility: CLINIC | Age: 69
End: 2024-05-07
Payer: MEDICARE

## 2024-05-07 VITALS
DIASTOLIC BLOOD PRESSURE: 82 MMHG | WEIGHT: 187 LBS | BODY MASS INDEX: 30.05 KG/M2 | OXYGEN SATURATION: 94 % | SYSTOLIC BLOOD PRESSURE: 140 MMHG | HEART RATE: 83 BPM | HEIGHT: 66 IN

## 2024-05-07 DIAGNOSIS — R06.02 SOB (SHORTNESS OF BREATH) ON EXERTION: ICD-10-CM

## 2024-05-07 DIAGNOSIS — R93.1 ABNORMAL HEART SCORE CT: ICD-10-CM

## 2024-05-07 PROCEDURE — 1160F RVW MEDS BY RX/DR IN RCRD: CPT

## 2024-05-07 PROCEDURE — 93000 ELECTROCARDIOGRAM COMPLETE: CPT

## 2024-05-07 PROCEDURE — 1036F TOBACCO NON-USER: CPT

## 2024-05-07 PROCEDURE — 99204 OFFICE O/P NEW MOD 45 MIN: CPT

## 2024-05-07 PROCEDURE — 1159F MED LIST DOCD IN RCRD: CPT

## 2024-05-07 RX ORDER — ASPIRIN 81 MG/1
81 TABLET ORAL DAILY
COMMUNITY

## 2024-05-07 RX ORDER — ATORVASTATIN CALCIUM 40 MG/1
40 TABLET, FILM COATED ORAL DAILY
COMMUNITY

## 2024-05-07 RX ORDER — ATORVASTATIN CALCIUM 20 MG/1
20 TABLET, FILM COATED ORAL NIGHTLY
Qty: 90 TABLET | Refills: 3 | Status: SHIPPED | OUTPATIENT
Start: 2024-05-07 | End: 2024-05-07 | Stop reason: DRUGHIGH

## 2024-05-14 ENCOUNTER — HOSPITAL ENCOUNTER (OUTPATIENT)
Dept: CARDIOLOGY | Facility: HOSPITAL | Age: 69
Discharge: HOME | End: 2024-05-14
Payer: MEDICARE

## 2024-05-14 VITALS — HEART RATE: 87 BPM | SYSTOLIC BLOOD PRESSURE: 133 MMHG | DIASTOLIC BLOOD PRESSURE: 76 MMHG

## 2024-05-14 DIAGNOSIS — R06.02 SOB (SHORTNESS OF BREATH) ON EXERTION: ICD-10-CM

## 2024-05-14 PROCEDURE — 93017 CV STRESS TEST TRACING ONLY: CPT

## 2024-05-14 PROCEDURE — 93016 CV STRESS TEST SUPVJ ONLY: CPT | Performed by: STUDENT IN AN ORGANIZED HEALTH CARE EDUCATION/TRAINING PROGRAM

## 2024-05-14 PROCEDURE — 93018 CV STRESS TEST I&R ONLY: CPT | Performed by: STUDENT IN AN ORGANIZED HEALTH CARE EDUCATION/TRAINING PROGRAM

## 2024-05-15 ENCOUNTER — TELEPHONE (OUTPATIENT)
Dept: CARDIOLOGY | Facility: HOSPITAL | Age: 69
End: 2024-05-15
Payer: MEDICARE

## 2024-05-15 NOTE — TELEPHONE ENCOUNTER
----- Message from Stephanie Moon CMA sent at 5/15/2024  9:13 AM EDT -----  Pt notified and FU changed  ----- Message -----  From: Malgorzata Bustamante CMA  Sent: 5/14/2024   2:26 PM EDT  To: Do [facility] Pandey Clinical Support Staff    Left a message for the pt to call the office back.   ----- Message -----  From: CABRERA Burkett-CNP, MONET  Sent: 5/14/2024   2:17 PM EDT  To: Do Aracely Pandey Clinical Support Staff    Please let patient know his stress test was normal. Recommend follow up in 6 months. Please cancel follow up appt on 5/22

## 2024-05-22 ENCOUNTER — APPOINTMENT (OUTPATIENT)
Dept: CARDIOLOGY | Facility: CLINIC | Age: 69
End: 2024-05-22
Payer: MEDICARE

## 2024-06-04 ENCOUNTER — HOSPITAL ENCOUNTER (EMERGENCY)
Facility: HOSPITAL | Age: 69
Discharge: HOME | End: 2024-06-04
Attending: EMERGENCY MEDICINE
Payer: MEDICARE

## 2024-06-04 ENCOUNTER — APPOINTMENT (OUTPATIENT)
Dept: RADIOLOGY | Facility: HOSPITAL | Age: 69
End: 2024-06-04
Payer: MEDICARE

## 2024-06-04 VITALS
RESPIRATION RATE: 16 BRPM | BODY MASS INDEX: 29.73 KG/M2 | HEART RATE: 64 BPM | WEIGHT: 185 LBS | OXYGEN SATURATION: 98 % | HEIGHT: 66 IN | SYSTOLIC BLOOD PRESSURE: 121 MMHG | TEMPERATURE: 97.6 F | DIASTOLIC BLOOD PRESSURE: 61 MMHG

## 2024-06-04 DIAGNOSIS — S63.258A CLOSED DISLOCATION OF RING FINGER: Primary | ICD-10-CM

## 2024-06-04 PROCEDURE — 99283 EMERGENCY DEPT VISIT LOW MDM: CPT

## 2024-06-04 PROCEDURE — 26770 TREAT FINGER DISLOCATION: CPT | Mod: LT

## 2024-06-04 PROCEDURE — 73130 X-RAY EXAM OF HAND: CPT | Mod: LEFT SIDE | Performed by: RADIOLOGY

## 2024-06-04 PROCEDURE — 73130 X-RAY EXAM OF HAND: CPT | Mod: LT

## 2024-06-04 ASSESSMENT — COLUMBIA-SUICIDE SEVERITY RATING SCALE - C-SSRS
2. HAVE YOU ACTUALLY HAD ANY THOUGHTS OF KILLING YOURSELF?: NO
6. HAVE YOU EVER DONE ANYTHING, STARTED TO DO ANYTHING, OR PREPARED TO DO ANYTHING TO END YOUR LIFE?: NO
1. IN THE PAST MONTH, HAVE YOU WISHED YOU WERE DEAD OR WISHED YOU COULD GO TO SLEEP AND NOT WAKE UP?: NO

## 2024-06-04 ASSESSMENT — PAIN SCALES - GENERAL: PAINLEVEL_OUTOF10: 0 - NO PAIN

## 2024-06-04 ASSESSMENT — VISUAL ACUITY: OU: 1

## 2024-06-04 ASSESSMENT — PAIN - FUNCTIONAL ASSESSMENT: PAIN_FUNCTIONAL_ASSESSMENT: 0-10

## 2024-06-04 NOTE — ED PROVIDER NOTES
Injury to left ring finger.  This 68-year-old white male states that prior to arrival to the ED he had slid off of a truck and when he did that his legs kind of gave out on him and he jammed his left ring finger into the ground with subsequent dislocation of the left ring finger.  He states that he did not realize that he had even injured this finger until he had gotten up and noticed that it was pointing normally.  He states that he is right-hand dominant.  Denies any other injuries.  He denies any numbness or tingling.  He admits to minimal discomfort from his finger from this injury.      History provided by:  Patient   used: No         Physical Exam  Vitals and nursing note reviewed.   Constitutional:       General: He is awake.      Appearance: Normal appearance. He is normal weight.   HENT:      Head: Normocephalic and atraumatic.      Right Ear: Hearing and external ear normal.      Left Ear: Hearing and external ear normal.      Nose: Nose normal. No congestion or rhinorrhea.      Mouth/Throat:      Lips: Pink.      Mouth: Mucous membranes are moist.      Pharynx: Oropharynx is clear. No oropharyngeal exudate or posterior oropharyngeal erythema.   Eyes:      General: Lids are normal. Vision grossly intact.         Right eye: No discharge.         Left eye: No discharge.      Extraocular Movements: Extraocular movements intact.      Conjunctiva/sclera: Conjunctivae normal.      Pupils: Pupils are equal, round, and reactive to light.   Cardiovascular:      Rate and Rhythm: Normal rate and regular rhythm.      Pulses: Normal pulses.      Heart sounds: Normal heart sounds. No murmur heard.     No friction rub. No gallop.   Pulmonary:      Effort: Pulmonary effort is normal. No respiratory distress.      Breath sounds: Normal breath sounds. No stridor. No wheezing, rhonchi or rales.   Chest:      Chest wall: No tenderness.   Abdominal:      General: Abdomen is protuberant. Bowel sounds are  normal. There is no distension.      Palpations: Abdomen is soft. There is no mass.      Tenderness: There is no abdominal tenderness. There is no guarding or rebound.      Hernia: No hernia is present.      Comments: Patient has benign abdominal exam.   Musculoskeletal:         General: No swelling, tenderness, deformity or signs of injury. Normal range of motion.        Hands:       Cervical back: Full passive range of motion without pain, normal range of motion and neck supple.      Right lower leg: No edema.      Left lower leg: No edema.      Comments: Evaluation of the left upper extremity.  Distal pulses are +2/4 and present at the radial ulnar aspect of the wrist.  Distally cap refill less than 2 seconds.  Patient is obvious deformity at the PIP joint of the left ring finger consistent with a dislocation.  Initially I applied traction to the digit with spontaneous reduction of the digit but it was so unstable that a reduced itself into dislocation again and subsequently reduce a second time and applied a simple splint to the digit to support it and prevent it from an reducing itself.   Skin:     General: Skin is warm and dry.      Capillary Refill: Capillary refill takes less than 2 seconds.      Coloration: Skin is not jaundiced or pale.      Findings: No bruising, erythema, lesion or rash.   Neurological:      General: No focal deficit present.      Mental Status: He is alert and oriented to person, place, and time.      GCS: GCS eye subscore is 4. GCS verbal subscore is 5. GCS motor subscore is 6.      Cranial Nerves: Cranial nerves 2-12 are intact. No cranial nerve deficit.      Sensory: Sensation is intact. No sensory deficit.      Motor: Motor function is intact. No weakness.      Coordination: Coordination is intact. Coordination normal.      Gait: Gait is intact.      Deep Tendon Reflexes: Reflexes normal.   Psychiatric:         Attention and Perception: Attention and perception normal.         Mood  and Affect: Mood and affect normal.         Speech: Speech normal.         Behavior: Behavior normal. Behavior is cooperative.         Thought Content: Thought content normal.         Cognition and Memory: Cognition and memory normal.         Judgment: Judgment normal.          Labs Reviewed - No data to display     XR hand left 3+ views   Final Result   Within the aforementioned limitation, no fracture or subluxation.        Signed by: Pop Haynes 6/4/2024 3:04 PM   Dictation workstation:   [id number]           Procedures     Medical Decision Making  Presented to the ED secondary to an injury to his left ring finger patient clinically had evidence of a dislocation with dorsal displacement of his distal ring finger in relation to the proximal phalanx at the small interphalangeal joint.  I went into the room initially and reduce it but due to instability of the finger into the position of dislocation and subsequent nursing staff aluminum splint after the second reduction which the patient tolerated fine the splint was applied to the patient and subsequent x-rays of the digit were performed.  There is no evidence of fractures of the bone.  Patient was referred to orthopedics for follow-up concerning this finger dislocation.  He was discharged home in stable satisfactory condition.         Diagnoses as of 06/06/24 0713   Closed dislocation of ring finger                    Michel Carson, DO  06/06/24 0713

## 2024-06-13 ENCOUNTER — HOSPITAL ENCOUNTER (OUTPATIENT)
Dept: RADIOLOGY | Facility: CLINIC | Age: 69
Discharge: HOME | End: 2024-06-13
Payer: MEDICARE

## 2024-06-13 ENCOUNTER — APPOINTMENT (OUTPATIENT)
Dept: ORTHOPEDIC SURGERY | Facility: CLINIC | Age: 69
End: 2024-06-13
Payer: MEDICARE

## 2024-06-13 DIAGNOSIS — S69.92XA HAND INJURY, LEFT, INITIAL ENCOUNTER: ICD-10-CM

## 2024-06-13 DIAGNOSIS — S69.92XS HAND INJURY, LEFT, SEQUELA: ICD-10-CM

## 2024-06-13 PROCEDURE — 1125F AMNT PAIN NOTED PAIN PRSNT: CPT | Performed by: NURSE PRACTITIONER

## 2024-06-13 PROCEDURE — 73130 X-RAY EXAM OF HAND: CPT | Mod: LT

## 2024-06-13 PROCEDURE — 1159F MED LIST DOCD IN RCRD: CPT | Performed by: NURSE PRACTITIONER

## 2024-06-13 PROCEDURE — 73130 X-RAY EXAM OF HAND: CPT | Mod: LEFT SIDE | Performed by: RADIOLOGY

## 2024-06-13 PROCEDURE — 1160F RVW MEDS BY RX/DR IN RCRD: CPT | Performed by: NURSE PRACTITIONER

## 2024-06-13 PROCEDURE — 99204 OFFICE O/P NEW MOD 45 MIN: CPT | Performed by: NURSE PRACTITIONER

## 2024-06-13 ASSESSMENT — PAIN SCALES - GENERAL: PAINLEVEL_OUTOF10: 2

## 2024-06-13 ASSESSMENT — PAIN - FUNCTIONAL ASSESSMENT: PAIN_FUNCTIONAL_ASSESSMENT: 0-10

## 2024-06-13 ASSESSMENT — PAIN DESCRIPTION - DESCRIPTORS: DESCRIPTORS: ACHING

## 2024-06-13 NOTE — PROGRESS NOTES
Subjective    Patient ID: Derick Palencia is a 68 y.o. male.    Chief Complaint: Pain and New Patient Visit of the Left Hand (Patient had a fall landing on his stretched out left hand, causing injury to his ring finger. The finger is swollen and black and blue. He went to the ER after it happened on 24. They did x-rays and give him a finger immoblizer type splint. He had another set of x-rays just prior to this appt. )       MOSES Sepulveda is a pleasant 68-year-old gentleman presenting today for new patient evaluation of left hand pain and left ring finger injury.  Patient states he tripped and jammed the finger into the ground and it was displaced.  It was reset per ED.  Patient was given a splint, however it was cumbersome how it was wrapped and jacinta taped and limiting his ability for activities.  Patient is using as tolerated.  Patient's not taking any medications for symptom control.  No prior injuries or surgeries to this finger or hand.  Patient is right-hand dominant.    Review of Systems   Constitutional: Negative.    HENT: Negative.     Respiratory: Negative.     Cardiovascular: Negative.    Endocrine: Negative.    Musculoskeletal:  Positive for arthralgias.   Skin: Negative.    Neurological: Negative.    Hematological: Negative.    Psychiatric/Behavioral: Negative.        Objective   Left Hand Exam     Tenderness   Left hand tenderness location: L ring finger PIP joint.     Range of Motion   Wrist   Extension:  normal   Flexion:  normal   Pronation:  normal   Supination:  normal   Hand   MP Rin   PIP Rin   DIP Rin     Other   Erythema: absent  Sensation: normal  Pulse: present    Comments:  Mild swelling and discoloration to the PIP joint, tenderness to palpation and motion.  Range of motion limited secondary to stiffness, swelling and increased symptoms.  Distal motor or sensory is intact with cap refill at 2 to 3 seconds.            Image Results:  XR hand left 3+ views  Narrative:  Interpreted By:  Pop Haynes,   STUDY:  XR HAND LEFT 3+ VIEWS; ;  6/4/2024 2:41 pm      INDICATION:  Signs/Symptoms:Injury to left ring finger.      COMPARISON:  None.      ACCESSION NUMBER(S):  [id number]      ORDERING CLINICIAN:  LUCIEN KAY      FINDINGS:  There is limitation due to positioning and due to splint over the 4th  digit obscuring some detail. There is no fracture or subluxation.  Degenerative changes  are present, with joint space narrowing and  small osteophytes. The alignment is anatomic.  The soft tissues are unremarkable.      Impression: Within the aforementioned limitation, no fracture or subluxation.      Signed by: Pop Haynes 6/4/2024 3:04 PM  Dictation workstation:   [id number]    Independent review of repeat images from today were reviewed during today's visit.  There is no apparent fracture or misalignment noted.  Positive soft tissue swelling remains around the PIP joint of the left ring finger.  Await radiology report.    Assessment/Plan   Encounter Diagnoses:  Problem List Items Addressed This Visit             ICD-10-CM    Hand injury, left, initial encounter S69.92XA     We discussed sx control with OTC Ibuprofen 3 times daily for the next 3 days with food, then 3 times daily as needed with food for pain, swelling and stiffness ,Tylenol prn  Frequent rest, ice and elevation  Wear finger splint at all times with exception to remove 1-2 times daily for hygiene, to be fitted in AlumaFoam finger splints securing ring finger.  Option to buddy tape as needed  Non weight bearing  F/U here in 2 to 3 weeks with repeat imaging, sooner for changes or concerns  This note was generated using Dragon software.  It may contain errors in wording, punctuation or spelling.          Relevant Orders    XR hand left 3+ views    Follow Up In Orthopaedic Surgery     Other Visit Diagnoses         Codes    Hand injury, left, sequela     S69.92XS    Relevant Orders    XR hand left 3+ views

## 2024-06-14 PROBLEM — S69.92XA HAND INJURY, LEFT, INITIAL ENCOUNTER: Status: ACTIVE | Noted: 2024-06-14

## 2024-06-14 ASSESSMENT — ENCOUNTER SYMPTOMS
ARTHRALGIAS: 1
PSYCHIATRIC NEGATIVE: 1
HEMATOLOGIC/LYMPHATIC NEGATIVE: 1
ENDOCRINE NEGATIVE: 1
NEUROLOGICAL NEGATIVE: 1
CONSTITUTIONAL NEGATIVE: 1
RESPIRATORY NEGATIVE: 1
CARDIOVASCULAR NEGATIVE: 1

## 2024-06-14 NOTE — ASSESSMENT & PLAN NOTE
We discussed sx control with OTC Ibuprofen 3 times daily for the next 3 days with food, then 3 times daily as needed with food for pain, swelling and stiffness ,Tylenol prn  Frequent rest, ice and elevation  Wear finger splint at all times with exception to remove 1-2 times daily for hygiene, to be fitted in AlumaFoam finger splints securing ring finger.  Option to buddy tape as needed  Non weight bearing  F/U here in 2 to 3 weeks with repeat imaging, sooner for changes or concerns  This note was generated using Dragon software.  It may contain errors in wording, punctuation or spelling.

## 2024-06-18 DIAGNOSIS — E78.5 HYPERLIPIDEMIA, UNSPECIFIED HYPERLIPIDEMIA TYPE: ICD-10-CM

## 2024-06-18 RX ORDER — ATORVASTATIN CALCIUM 40 MG/1
40 TABLET, FILM COATED ORAL DAILY
Qty: 90 TABLET | Refills: 1 | Status: SHIPPED | OUTPATIENT
Start: 2024-06-18

## 2024-07-03 ENCOUNTER — APPOINTMENT (OUTPATIENT)
Dept: ORTHOPEDIC SURGERY | Facility: CLINIC | Age: 69
End: 2024-07-03
Payer: MEDICARE

## 2024-07-03 ENCOUNTER — HOSPITAL ENCOUNTER (OUTPATIENT)
Dept: RADIOLOGY | Facility: CLINIC | Age: 69
Discharge: HOME | End: 2024-07-03
Payer: MEDICARE

## 2024-07-03 DIAGNOSIS — S69.92XS HAND INJURY, LEFT, SEQUELA: ICD-10-CM

## 2024-07-03 DIAGNOSIS — S69.92XA HAND INJURY, LEFT, INITIAL ENCOUNTER: Primary | ICD-10-CM

## 2024-07-03 PROCEDURE — 1159F MED LIST DOCD IN RCRD: CPT | Performed by: NURSE PRACTITIONER

## 2024-07-03 PROCEDURE — 73130 X-RAY EXAM OF HAND: CPT | Mod: LT

## 2024-07-03 PROCEDURE — 1036F TOBACCO NON-USER: CPT | Performed by: NURSE PRACTITIONER

## 2024-07-03 PROCEDURE — 1160F RVW MEDS BY RX/DR IN RCRD: CPT | Performed by: NURSE PRACTITIONER

## 2024-07-03 PROCEDURE — 73130 X-RAY EXAM OF HAND: CPT | Mod: LEFT SIDE | Performed by: RADIOLOGY

## 2024-07-03 PROCEDURE — 99213 OFFICE O/P EST LOW 20 MIN: CPT | Performed by: NURSE PRACTITIONER

## 2024-07-03 ASSESSMENT — ENCOUNTER SYMPTOMS
RESPIRATORY NEGATIVE: 1
HEMATOLOGIC/LYMPHATIC NEGATIVE: 1
CARDIOVASCULAR NEGATIVE: 1
ENDOCRINE NEGATIVE: 1
PSYCHIATRIC NEGATIVE: 1
NEUROLOGICAL NEGATIVE: 1
CONSTITUTIONAL NEGATIVE: 1

## 2024-07-03 ASSESSMENT — PAIN - FUNCTIONAL ASSESSMENT: PAIN_FUNCTIONAL_ASSESSMENT: NO/DENIES PAIN

## 2024-07-03 NOTE — PROGRESS NOTES
Subjective    Patient ID: Derick Palencia is a 68 y.o. male.    Chief Complaint: Pain and New Patient Visit of the Left Hand (Patient states reports no pain, but does have some swelling in the knuckle of the left ring finger. X-rays just prior to this appt. )       MOSES Sepulveda is a pleasant 68-year-old gentleman presenting today for FUV of left hand pain and left ring finger injury.  Patient states he tripped and jammed the finger into the ground and it was displaced.  It was reset per ED.   Patient has not been using foam splint as it is cumbersome with his activities.  He does buddy tape as needed and performing activities as  tolerated  Occasional use of acetaminophen, does not help with swelling, no ice attempted  No prior injuries or surgeries to this finger or hand.  Patient is right-hand dominant.      Review of Systems   Constitutional: Negative.    HENT: Negative.     Respiratory: Negative.     Cardiovascular: Negative.    Endocrine: Negative.    Skin: Negative.    Neurological: Negative.    Hematological: Negative.    Psychiatric/Behavioral: Negative.        Objective   Left Hand Exam     Tenderness   Left hand tenderness location: Denies.     Range of Motion   Wrist   Extension:  normal   Flexion:  normal   Pronation:  normal   Supination:  normal   Hand   MP Rin   PIP Rin   DIP Rin     Other   Erythema: absent  Sensation: normal  Pulse: present    Comments:  Mild swelling remains to the PIP joint, no pain with palpation or range of motion. slightly limited range of motion secondary to stiffness, swelling at the PIP joint. distal motor or sensory is intact with cap refill at 2 to 3 seconds.            Image Results:  XR hand left 3+ views  Narrative: Interpreted By:  Tyree Gutierrez,   STUDY:  XR HAND LEFT 3+ VIEWS; ;  2024 3:01 pm      INDICATION:  Signs/Symptoms:S/P FRACTURE OF RING FINGER.      COMPARISON:  None.      ACCESSION NUMBER(S):  [id number]      ORDERING  CLINICIAN:  MP CLARK      FINDINGS:  Left hand, three views      There is no fracture. There is no dislocation. There is sclerosis and  osteophytosis at right scaphoid 1st CMC joints. Mild degenerative  change the 2nd MCP joint.. There is no lytic or sclerotic lesion.  There is no soft tissue abnormality seen.      Impression: Mild degenerative change at the base of the thumb and the 2nd MCP  joint. No acute abnormality      MACRO:  None      Signed by: Tyree Gutierrez 6/14/2024 7:37 PM  Dictation workstation:   [id number]    Independent review of repeat images from today were reviewed during today's visit.  There is no apparent fracture or misalignment noted, appears similar to prior imaging. await radiology report.    Assessment/Plan   Problem List Items Addressed This Visit             ICD-10-CM    Hand injury, left, sequela - Primary S69.92XS     Recommend continuing to buddy tape ring and middle fingers with activity over the next 2 weeks, may remove with rest and sleep.  Recommended OTC Ibuprofen as needed with food for pain, swelling and stiffness ,Tylenol prn.  Ice as needed for swelling and/or pain  Continue with light activity over the next 2 weeks, gradually increase as tolerated  Plan for follow-up here on as needed basis  This note was generated using Dragon software.  It may contain errors in wording, punctuation or spelling.

## 2024-07-03 NOTE — ASSESSMENT & PLAN NOTE
Recommend continuing to buddy tape ring and middle fingers with activity over the next 2 weeks, may remove with rest and sleep.  Recommended OTC Ibuprofen as needed with food for pain, swelling and stiffness ,Tylenol prn.  Ice as needed for swelling and/or pain  Continue with light activity over the next 2 weeks, gradually increase as tolerated  Plan for follow-up here on as needed basis  This note was generated using Dragon software.  It may contain errors in wording, punctuation or spelling.

## 2024-07-18 ENCOUNTER — APPOINTMENT (OUTPATIENT)
Dept: PRIMARY CARE | Facility: CLINIC | Age: 69
End: 2024-07-18
Payer: MEDICARE

## 2024-07-19 ENCOUNTER — APPOINTMENT (OUTPATIENT)
Dept: PRIMARY CARE | Facility: CLINIC | Age: 69
End: 2024-07-19
Payer: MEDICARE

## 2024-09-17 ENCOUNTER — APPOINTMENT (OUTPATIENT)
Dept: PRIMARY CARE | Facility: CLINIC | Age: 69
End: 2024-09-17
Payer: MEDICARE

## 2024-09-17 VITALS
BODY MASS INDEX: 29.25 KG/M2 | DIASTOLIC BLOOD PRESSURE: 74 MMHG | WEIGHT: 182 LBS | SYSTOLIC BLOOD PRESSURE: 129 MMHG | HEART RATE: 84 BPM | HEIGHT: 66 IN

## 2024-09-17 DIAGNOSIS — I73.9 PVD (PERIPHERAL VASCULAR DISEASE) (CMS-HCC): ICD-10-CM

## 2024-09-17 DIAGNOSIS — Z23 NEED FOR INFLUENZA VACCINATION: Primary | ICD-10-CM

## 2024-09-17 DIAGNOSIS — E66.09 CLASS 1 OBESITY DUE TO EXCESS CALORIES WITHOUT SERIOUS COMORBIDITY WITH BODY MASS INDEX (BMI) OF 30.0 TO 30.9 IN ADULT: ICD-10-CM

## 2024-09-17 DIAGNOSIS — E78.2 MIXED HYPERLIPIDEMIA: ICD-10-CM

## 2024-09-17 PROCEDURE — 90662 IIV NO PRSV INCREASED AG IM: CPT | Performed by: INTERNAL MEDICINE

## 2024-09-17 PROCEDURE — 1036F TOBACCO NON-USER: CPT | Performed by: INTERNAL MEDICINE

## 2024-09-17 PROCEDURE — 3008F BODY MASS INDEX DOCD: CPT | Performed by: INTERNAL MEDICINE

## 2024-09-17 PROCEDURE — 1160F RVW MEDS BY RX/DR IN RCRD: CPT | Performed by: INTERNAL MEDICINE

## 2024-09-17 PROCEDURE — G0008 ADMIN INFLUENZA VIRUS VAC: HCPCS | Performed by: INTERNAL MEDICINE

## 2024-09-17 PROCEDURE — 99214 OFFICE O/P EST MOD 30 MIN: CPT | Performed by: INTERNAL MEDICINE

## 2024-09-17 PROCEDURE — 1159F MED LIST DOCD IN RCRD: CPT | Performed by: INTERNAL MEDICINE

## 2024-09-17 ASSESSMENT — ENCOUNTER SYMPTOMS
ABDOMINAL DISTENTION: 0
NAUSEA: 0
APPETITE CHANGE: 0
SINUS PAIN: 0
CHILLS: 0
BACK PAIN: 0
ACTIVITY CHANGE: 0
COUGH: 0
FATIGUE: 0
WEAKNESS: 0
VOMITING: 0
SORE THROAT: 0
SINUS PRESSURE: 0
DIARRHEA: 0
NUMBNESS: 0
SHORTNESS OF BREATH: 1
WHEEZING: 0

## 2024-09-17 NOTE — PROGRESS NOTES
"Subjective   Patient ID: Derick Palencia is a 69 y.o. male who presents for Follow-up (F/U [facility] APPT 07/18/24 -3 MONTH/+5 month follow up today) and Immunizations (Would like to discuss ).  HPI  Patient is here today for follow up     Patient reports that he continues to have sob on exertion, he had PFTs which were normal, had elevated CT calcium score of 410, had exercise stress test which was negative for ischemia. Statin was increased by cardiology to 40mg. Pt denies side effects on higher dose.       Review of Systems   Constitutional:  Negative for activity change, appetite change, chills and fatigue.   HENT:  Negative for congestion, postnasal drip, sinus pressure, sinus pain and sore throat.    Respiratory:  Positive for shortness of breath. Negative for cough and wheezing.    Cardiovascular:  Negative for chest pain and leg swelling.   Gastrointestinal:  Negative for abdominal distention, diarrhea, nausea and vomiting.   Musculoskeletal:  Negative for back pain.   Neurological:  Negative for weakness and numbness.       Objective   /74   Pulse 84   Ht 1.676 m (5' 6\")   Wt 82.6 kg (182 lb)   BMI 29.38 kg/m²     Physical Exam  Constitutional:       General: He is not in acute distress.     Appearance: Normal appearance. He is not toxic-appearing.   HENT:      Head: Normocephalic and atraumatic.      Nose: Nose normal.      Mouth/Throat:      Mouth: Mucous membranes are moist.   Eyes:      Extraocular Movements: Extraocular movements intact.      Conjunctiva/sclera: Conjunctivae normal.      Pupils: Pupils are equal, round, and reactive to light.   Cardiovascular:      Rate and Rhythm: Normal rate and regular rhythm.      Heart sounds: No murmur heard.     No friction rub. No gallop.   Pulmonary:      Effort: Pulmonary effort is normal.      Breath sounds: Normal breath sounds.   Abdominal:      General: Bowel sounds are normal. There is no distension.      Palpations: Abdomen is soft. There is no " mass.   Musculoskeletal:         General: No swelling. Normal range of motion.      Cervical back: Normal range of motion.   Skin:     General: Skin is warm and dry.   Neurological:      General: No focal deficit present.      Mental Status: He is alert and oriented to person, place, and time.   Psychiatric:         Mood and Affect: Mood normal.         Thought Content: Thought content normal.         Judgment: Judgment normal.           Assessment/Plan   Problem List Items Addressed This Visit       HLD (hyperlipidemia)    RESOLVED: PVD (peripheral vascular disease) (CMS-HCC)    Class 1 obesity with body mass index (BMI) of 30.0 to 30.9 in adult     Other Visit Diagnoses       Need for influenza vaccination    -  Primary    Relevant Orders    Flu vaccine, trivalent, preservative free, HIGH-DOSE, age 65y+ (Fluzone)          HLD    - continue statin          2. Sob on exertion, former smoker, denies chest pain   - PFTs normal   - echocardiogram showed EF 60% , left ventricular diastolic filling   - elevated ct calcium score of 410   - stress test negative for ischemia   - discussed that even though PFTs were normal would consider trying albuterol prn or symbicort and see if symptoms improve since he is a former smoker   - declines for now     3. Given flu shot today      Final diagnoses:   [Z23] Need for influenza vaccination   [E78.2] Mixed hyperlipidemia   [I73.9] PVD (peripheral vascular disease) (CMS-HCC)   [E66.09, Z68.30] Class 1 obesity due to excess calories without serious comorbidity with body mass index (BMI) of 30.0 to 30.9 in adult

## 2024-11-12 NOTE — PROGRESS NOTES
CHIEF COMPLAINT  6 month follow up     HISTORY OF PRESENT ILLNESS    Patient presents for routine follow up. Previously evaluated given elevated CAC score and shortness of breath which a stress test was ordered and was negative. Patient has no complaints.     Cardiovascular hx:    1.Elevated CAC score:  -CAC score of 410.98  -Current medical therapy includes ASA 81 mg and atorvastatin 40mg daily     The 10-year ASCVD risk score (Yogi GARZA, et al., 2019) is: 14.1%    Values used to calculate the score:      Age: 69 years      Sex: Male      Is Non- : No      Diabetic: No      Tobacco smoker: No      Systolic Blood Pressure: 118 mmHg      Is BP treated: No      HDL Cholesterol: 56 mg/dL      Total Cholesterol: 149 mg/dL              Cardiovascular testing:  Stress test (May, 2024)-exercise EKG negative for ischemia; [facility] treadmill score is 5  2. Echo (April, 2024)-preserved biventricular function with grade I diastolic dysfunction         Past Medical, Surgical, and Family History reviewed and updated in chart.     Reviewed all medications by prescribing practitioner or clinical pharmacist (such as prescriptions, OTCs, herbal therapies and supplements) and documented in the medical record.    Past Medical History  Past Medical History:   Diagnosis Date    Abnormal finding on screening procedure 02/03/2023    Bladder wall thickening 02/03/2023    BPPV (benign paroxysmal positional vertigo) 02/03/2023    Erythematous bladder mucosa 02/03/2023    Groin pain 02/03/2023    Hematuria 02/03/2023    Herpes zoster infection of thoracic region 02/03/2023    Nocturia 02/03/2023    Personal history of urinary (tract) infections     History of urinary tract infection    Prostatitis 02/03/2023    PVD (peripheral vascular disease) (CMS-[facility]) 02/03/2023    SOB (shortness of breath) on exertion 02/03/2023    Urinary frequency 02/03/2023       Social History  Social History     Tobacco Use    Smoking  "status: Former     Types: Cigarettes    Smokeless tobacco: Never   Vaping Use    Vaping status: Never Used   Substance Use Topics    Alcohol use: Yes     Comment: Weekly alcohol    Drug use: Never       Family History     Family History   Problem Relation Name Age of Onset    Dementia Father      Colon polyps Sister      Lung disease Other          Allergies:  No Known Allergies     Outpatient Medications:  Current Outpatient Medications   Medication Instructions    aspirin 81 mg, Daily    atorvastatin (LIPITOR) 40 mg, oral, Daily    cholecalciferol (Vitamin D-3) 50 mcg (2,000 unit) capsule Take by mouth.    coenzyme Q-10 200 mg capsule Take by mouth.    fish oil concentrate (Omega-3) 120-180 mg capsule Take by mouth.    lutein 10 mg tablet oral    zeaxanthin, bulk, 90 % powder Zeaxanthin Powder   Refills: 0        Sari Vigil DO   Start : 6-Apr-2021   Active          Labs:   CMP:  Recent Labs     04/08/24  1051 04/13/22  0741 05/04/21  0703 04/08/21  0907    139  --  139   K 4.4 4.0  --  4.2    104  --  106   CO2 28 29  --  27   ANIONGAP 13 10  --  10   BUN 17 22 20 18   CREATININE 1.27 1.02 1.25 1.06   EGFR 61  --   --   --      Recent Labs     04/08/24  1051 04/13/22  0741 04/08/21  0907   ALBUMIN 4.6 4.4 4.2   ALKPHOS 56 53 55   ALT 48 35 30   AST 30 26 21   BILITOT 1.1 1.2 1.2     CBC:  Recent Labs     04/08/21  0907   WBC 6.1   HGB 15.0   HCT 46.5      MCV 88     COAG: No results for input(s): \"PTT\", \"INR\", \"HAUF\", \"DDIMERVTE\", \"HAPTOGLOBIN\", \"FIBRINOGEN\" in the last 92515 hours.  ABO: No results for input(s): \"ABO\" in the last 55719 hours.  HEME/ENDO:  Recent Labs     04/08/21  0907   HGBA1C 5.8      CARDIAC: No results for input(s): \"LDH\", \"CKMB\", \"TROPHS\", \"BNP\" in the last 24370 hours.    No lab exists for component: \"CK\", \"CKMBP\"  Recent Labs     04/08/24  1051 10/07/22  0930 04/13/22  0741 04/08/21  0907   CHOL 149 134 250* 193   LDLF  --  59 171* 115*   HDL 56.0 55.0 54.0 46.0 " "  TRIG 109 102 125 160*     MICRO: No results for input(s): \"ESR\", \"CRP\", \"PROCAL\" in the last 18093 hours.  No results found for the last 90 days.    Notable Studies: imaging personally reviewed   EKG:No results found for this or any previous visit (from the past 4464 hours).  Echocardiogram: No results found for this or any previous visit from the past 1825 days.    Stress Testing: No results found for this or any previous visit from the past 1825 days.    Cardiac Catheterization: No results found for this or any previous visit from the past 1825 days.  No results found for this or any previous visit from the past 3650 days.         REVIEW OF SYSTEMS  A 10-point system review was completed and was negative except as noted in the HPI.      VITALS  Vitals:    11/15/24 1143   BP: 118/66   Pulse: 88   SpO2: 98%       PHYSICAL EXAM  General: awake, alert and oriented. No acute distress.   Skin: Skin is warm, dry and intact without rashes or lesions.  HEENT: normocephalic, atraumatic; conjunctivae are clear without exudates or hemorrhage. Sclera is non-icteric. Eyelids are normal in appearance without swelling or lesions. Hearing intact. Nares are patent bilaterally. Moist mucous membranes.   Cardiovascular: heart rate and rhythm are normal. No murmurs, gallops, or rubs are auscultated. S1 and S2 are heard and are of normal intensity.   Respiratory: bilateral lung sounds clear to auscultations without rales, rhonchi, or wheezes. No accessory muscle use or stridor  Extremities:  no swelling or erythema  Neurological: no focal deficits; gait steady  Psychiatric: appropriate mood and affect; good judgment and insight          ASSESSMENT AND PLAN  Assessment/Plan   Diagnoses and all orders for this visit:  Mixed hyperlipidemia  Abnormal Heart Score CT  -Stable; continue ASA 81mg and high-intensity statin  -BP normotensive      RTC: 1 year      Thank you for allowing me to participate in the care of this patient. Please " reach me out if you have any questions or if you need any clarifications regarding the patient's care.    Emliie Kaufman, MONET, APRN, FNP-C  Division of Cardiovascular Medicine  [facility] [place]  [facility]

## 2024-11-15 ENCOUNTER — APPOINTMENT (OUTPATIENT)
Dept: CARDIOLOGY | Facility: CLINIC | Age: 69
End: 2024-11-15
Payer: MEDICARE

## 2024-11-15 VITALS
HEART RATE: 88 BPM | BODY MASS INDEX: 29.39 KG/M2 | SYSTOLIC BLOOD PRESSURE: 118 MMHG | WEIGHT: 182.9 LBS | HEIGHT: 66 IN | DIASTOLIC BLOOD PRESSURE: 66 MMHG | OXYGEN SATURATION: 98 %

## 2024-11-15 DIAGNOSIS — E78.2 MIXED HYPERLIPIDEMIA: Primary | ICD-10-CM

## 2024-11-15 DIAGNOSIS — R93.1 ABNORMAL HEART SCORE CT: ICD-10-CM

## 2024-11-15 PROCEDURE — 1160F RVW MEDS BY RX/DR IN RCRD: CPT

## 2024-11-15 PROCEDURE — 3008F BODY MASS INDEX DOCD: CPT

## 2024-11-15 PROCEDURE — 1036F TOBACCO NON-USER: CPT

## 2024-11-15 PROCEDURE — 1159F MED LIST DOCD IN RCRD: CPT

## 2024-11-15 PROCEDURE — 99214 OFFICE O/P EST MOD 30 MIN: CPT

## 2024-12-08 DIAGNOSIS — E78.5 HYPERLIPIDEMIA, UNSPECIFIED HYPERLIPIDEMIA TYPE: ICD-10-CM

## 2024-12-09 RX ORDER — ATORVASTATIN CALCIUM 40 MG/1
40 TABLET, FILM COATED ORAL DAILY
Qty: 90 TABLET | Refills: 3 | Status: SHIPPED | OUTPATIENT
Start: 2024-12-09

## 2025-03-18 ENCOUNTER — APPOINTMENT (OUTPATIENT)
Dept: PRIMARY CARE | Facility: CLINIC | Age: 70
End: 2025-03-18
Payer: MEDICARE

## 2025-11-12 ENCOUNTER — APPOINTMENT (OUTPATIENT)
Dept: CARDIOLOGY | Facility: CLINIC | Age: 70
End: 2025-11-12
Payer: MEDICARE